# Patient Record
Sex: FEMALE | Race: WHITE | Employment: FULL TIME | URBAN - METROPOLITAN AREA
[De-identification: names, ages, dates, MRNs, and addresses within clinical notes are randomized per-mention and may not be internally consistent; named-entity substitution may affect disease eponyms.]

---

## 2020-07-26 ENCOUNTER — HOSPITAL ENCOUNTER (EMERGENCY)
Age: 45
Discharge: HOME | End: 2020-07-26
Attending: EMERGENCY MEDICINE
Payer: COMMERCIAL

## 2020-07-26 ENCOUNTER — APPOINTMENT (OUTPATIENT)
Dept: GENERAL RADIOLOGY | Age: 45
End: 2020-07-26
Payer: COMMERCIAL

## 2020-07-26 VITALS
SYSTOLIC BLOOD PRESSURE: 150 MMHG | DIASTOLIC BLOOD PRESSURE: 96 MMHG | TEMPERATURE: 98 F | OXYGEN SATURATION: 97 % | HEART RATE: 67 BPM | RESPIRATION RATE: 18 BRPM

## 2020-07-26 PROCEDURE — 99283 EMERGENCY DEPT VISIT LOW MDM: CPT

## 2020-07-26 PROCEDURE — 6370000000 HC RX 637 (ALT 250 FOR IP): Performed by: EMERGENCY MEDICINE

## 2020-07-26 PROCEDURE — 73564 X-RAY EXAM KNEE 4 OR MORE: CPT

## 2020-07-26 RX ORDER — HYDROCODONE BITARTRATE AND ACETAMINOPHEN 5; 325 MG/1; MG/1
1 TABLET ORAL ONCE
Status: COMPLETED | OUTPATIENT
Start: 2020-07-26 | End: 2020-07-26

## 2020-07-26 RX ORDER — IBUPROFEN 800 MG/1
800 TABLET ORAL ONCE
Status: COMPLETED | OUTPATIENT
Start: 2020-07-26 | End: 2020-07-26

## 2020-07-26 RX ORDER — IBUPROFEN 800 MG/1
800 TABLET ORAL EVERY 8 HOURS PRN
Qty: 20 TABLET | Refills: 0 | Status: SHIPPED | OUTPATIENT
Start: 2020-07-26

## 2020-07-26 RX ORDER — HYDROCODONE BITARTRATE AND ACETAMINOPHEN 5; 325 MG/1; MG/1
1-2 TABLET ORAL EVERY 8 HOURS PRN
Qty: 15 TABLET | Refills: 0 | Status: SHIPPED | OUTPATIENT
Start: 2020-07-26 | End: 2020-07-29

## 2020-07-26 RX ADMIN — IBUPROFEN 800 MG: 800 TABLET, FILM COATED ORAL at 10:18

## 2020-07-26 RX ADMIN — HYDROCODONE BITARTRATE AND ACETAMINOPHEN 1 TABLET: 5; 325 TABLET ORAL at 11:44

## 2020-07-26 RX ADMIN — HYDROCODONE BITARTRATE AND ACETAMINOPHEN 1 TABLET: 5; 325 TABLET ORAL at 10:18

## 2020-07-26 ASSESSMENT — PAIN DESCRIPTION - ORIENTATION: ORIENTATION: RIGHT

## 2020-07-26 ASSESSMENT — PAIN DESCRIPTION - LOCATION: LOCATION: KNEE

## 2020-07-26 ASSESSMENT — PAIN SCALES - GENERAL
PAINLEVEL_OUTOF10: 7
PAINLEVEL_OUTOF10: 7
PAINLEVEL_OUTOF10: 5

## 2020-07-26 ASSESSMENT — PAIN DESCRIPTION - PAIN TYPE: TYPE: ACUTE PAIN

## 2020-07-26 NOTE — ED NOTES
Pt medicated per orders. Denies further needs at this time, call light within reach.      Pervis Frankel, RN  07/26/20 9064

## 2020-07-26 NOTE — ED NOTES
Knee immobilizer applied to RLE and pt supplied crutches. Pt educated on proper use.      Sol Davies RN  07/26/20 1829

## 2020-07-26 NOTE — ED PROVIDER NOTES
[facility] Emergency Department      Pt Name: Rosa Douglas  MRN: [id number]  [place] 1975  Date of evaluation: 7/26/2020  Provider: Javi Cohen MD  CHIEF COMPLAINT  Chief Complaint   Patient presents with    Knee Injury     Pt states she was jumping on a trampoline last night when she injured her R knee - states previous surgery in that knee, pain with weightbearing     HPI  Rosa Douglas is a 39 y.o. female who presents because of knee pain. She was jumping on a trampoline last night and she injured her right knee. She says that she twisted it wrong and she felt a pop. She is been unable to bear weight since then. She denies any other injury. She does have history of prior injury to both of her knees, right knee about 6 years ago. REVIEW OF SYSTEMS:  No other injury, swelling present, no numbness Pertinent positives and negatives as per the HPI. All other review of systems reviewed and negative. Nursing notes reviewed. PAST MEDICAL HISTORY  Past Medical History:   Diagnosis Date    Anxiety      SURGICAL HISTORY  Past Surgical History:   Procedure Laterality Date    APPENDECTOMY  08/01/2011    open appendectomy    CARPAL TUNNEL RELEASE      bilat    DILATION AND CURETTAGE OF UTERUS      miscarriage x2    KNEE SURGERY      left x3     MEDICATIONS:  No current facility-administered medications on file prior to encounter. No current outpatient medications on file prior to encounter. ALLERGIES  Penicillins  SOCIAL HISTORY:  Social History     Tobacco Use    Smoking status: Never Smoker    Smokeless tobacco: Never Used   Substance Use Topics    Alcohol use: No     Comment: occ    Drug use: No     IMMUNIZATIONS:  Noncontributory    PHYSICAL EXAM  VITAL SIGNS:  Blood pressure (!) 150/96, pulse 67, temperature 98 °F (36.7 °C), temperature source Oral, resp. rate 18, SpO2 97 %.   Constitutional:  39 y.o. female who does not appear toxic  HENT:  Atraumatic, mucous membranes moist  Eyes:   Conjunctiva clear, no icterus  Neck:  Supple, no signs of injury  Thorax & Lungs:  Respiratory effort normal  Abdomen:  Non distended  Back:  No deformity  Extremity:  Skin appears normal, obvious effusion, no LL appreciated but patient is guarding, strong pedal pulse, intact sensation, ankle is nontender, tenderness is lateral, posterior, full rom not possible due to pain but position of comfort is extension    Skin:  Warm, dry    DIAGNOSTIC RESULTS:    RADIOLOGY:    Plain x-rays were viewed by me:   XR KNEE RIGHT (MIN 4 VIEWS)   Final Result   Mild degenerative changes. Large patellofemoral joint effusion. This is nonspecific and should be   correlated clinically. ED COURSE:    Medications administered:  Medications   ibuprofen (ADVIL;MOTRIN) tablet 800 mg (800 mg Oral Given 7/26/20 1018)   HYDROcodone-acetaminophen (NORCO) 5-325 MG per tablet 1 tablet (1 tablet Oral Given 7/26/20 1018)   HYDROcodone-acetaminophen (NORCO) 5-325 MG per tablet 1 tablet (1 tablet Oral Given 7/26/20 1144)     Vitals:    07/26/20 0949 07/26/20 1144   BP: (!) 171/118 (!) 150/96   Pulse: 90 67   Resp: 18    Temp: 98 °F (36.7 °C)    TempSrc: Oral    SpO2: 100% 97%     PROCEDURES:  None    CONSULTATIONS:  None    MEDICAL DECISION MAKING: Garry Schilling is a 39 y.o. female who presented because of a painful knee. She has clinical and radiographic effusion. We talked about possible soft tissue sources for pain that do not show up on x ray (cartilage, ligament, tendon, etc). The patient likely needs an MRI in the future to determine the full extent of her injury. Garry Schilling was given appropriate discharge instructions. Referral to follow up provider.    Differential Diagnosis:  Fracture, sprain, neurovascular injury, infection, other  Discharge Medication List as of 7/26/2020 12:30 PM      START taking these medications    Details   HYDROcodone-acetaminophen (NORCO) 5-325 MG per tablet Take 1-2 tablets

## 2020-07-26 NOTE — ED NOTES
Pt Discharged in stable condition, VSS, no signs of distress, discharge instructions and meds reviewed. Pt verbalizes understanding and states no further questions or concerns unaddressed. Pt taken to family car via wheelchair.        Miklaa Cueto RN  07/26/20 1477

## 2020-07-26 NOTE — ED NOTES
Pt reports to ER for complaint of acute R knee pain that began yesterday following jumping on a trampoline and hearing a \"pop. \" Pt able to walk, but pain becomes more severe with weightbearing. Pt denies N/T and is able to wiggle toes. No obvious deformity noted, no bruising. Pt reports having L ACL surgery 5-7 years ago, and states pain to R knee feels similar. Pt A/Ox4 and in obvious distress at this time.      Alok Gomez RN  07/26/20 8794

## 2021-09-09 ENCOUNTER — HOSPITAL ENCOUNTER (OUTPATIENT)
Dept: MAMMOGRAPHY | Age: 46
Discharge: HOME | End: 2021-09-09
Payer: COMMERCIAL

## 2021-09-09 VITALS — WEIGHT: 165 LBS | HEIGHT: 65 IN | BODY MASS INDEX: 27.49 KG/M2

## 2021-09-09 DIAGNOSIS — Z12.31 ENCOUNTER FOR SCREENING MAMMOGRAM FOR MALIGNANT NEOPLASM OF BREAST: ICD-10-CM

## 2021-09-09 PROCEDURE — 77067 SCR MAMMO BI INCL CAD: CPT
